# Patient Record
Sex: FEMALE | Race: WHITE | NOT HISPANIC OR LATINO
[De-identification: names, ages, dates, MRNs, and addresses within clinical notes are randomized per-mention and may not be internally consistent; named-entity substitution may affect disease eponyms.]

---

## 2019-02-21 ENCOUNTER — APPOINTMENT (OUTPATIENT)
Dept: OPHTHALMOLOGY | Facility: CLINIC | Age: 34
End: 2019-02-21
Payer: COMMERCIAL

## 2019-02-21 PROBLEM — Z00.00 ENCOUNTER FOR PREVENTIVE HEALTH EXAMINATION: Status: ACTIVE | Noted: 2019-02-21

## 2019-02-21 PROCEDURE — ZZZZZ: CPT

## 2021-03-15 ENCOUNTER — APPOINTMENT (OUTPATIENT)
Dept: HEART AND VASCULAR | Facility: CLINIC | Age: 36
End: 2021-03-15
Payer: COMMERCIAL

## 2021-03-15 DIAGNOSIS — R10.2 PELVIC AND PERINEAL PAIN: ICD-10-CM

## 2021-03-15 DIAGNOSIS — Z78.9 OTHER SPECIFIED HEALTH STATUS: ICD-10-CM

## 2021-03-15 DIAGNOSIS — Z86.19 PERSONAL HISTORY OF OTHER INFECTIOUS AND PARASITIC DISEASES: ICD-10-CM

## 2021-03-15 DIAGNOSIS — N94.89 OTHER SPECIFIED CONDITIONS ASSOCIATED WITH FEMALE GENITAL ORGANS AND MENSTRUAL CYCLE: ICD-10-CM

## 2021-03-15 DIAGNOSIS — A69.20 LYME DISEASE, UNSPECIFIED: ICD-10-CM

## 2021-03-15 PROCEDURE — 99072 ADDL SUPL MATRL&STAF TM PHE: CPT

## 2021-03-15 PROCEDURE — 99204 OFFICE O/P NEW MOD 45 MIN: CPT

## 2021-03-17 PROBLEM — R10.2 PELVIC PAIN: Status: ACTIVE | Noted: 2021-03-17

## 2021-03-17 PROBLEM — A69.20 LYME DISEASE: Status: ACTIVE | Noted: 2021-03-17

## 2021-03-17 PROBLEM — N94.89 PELVIC CONGESTION SYNDROME: Status: ACTIVE | Noted: 2021-03-15

## 2021-03-17 NOTE — ASSESSMENT
[FreeTextEntry1] : Aria Manzanares is a 35-year-old female with symptoms of pelvic congestion syndrome which began during her pregnancy a year and a half ago and became severe by the end of the pregnancy. She still has persistent symptoms including pelvic pressure and bloating which is worse when she is standing. 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 She denies any abnormal bleeding. She had an MRI performed which shows fairly extensive pelvic varicosities. Apparently she had some sort of sacroiliac damage during her delivery and took a very long time to recover terms of physical activity. Her history is also significant for multiple allergies although none to medicines. She also has a history of Lyme Disease and is on doxycycline. In addition she has some generalized systemic symptoms suggestive of connective tissue disorder although she states her rheumatologic workup so far has been negative. She takes multiple supplements and has chronic digestive symptoms. We discussed the nature of pelvic congestion and treatment, specifically ovarian vein embolization. She does wish to have further pregnancies and we discussed timing and the impact of the procedure on future pregnancies. I told her we could also do sclerotherapy of symptomatic external varicosities at the same time. She has been seen elsewhere including [facility] and had issues with insurance coverage. I told her we would try to work with her to have the procedure approved.

## 2022-02-11 ENCOUNTER — APPOINTMENT (OUTPATIENT)
Dept: OPHTHALMOLOGY | Facility: CLINIC | Age: 37
End: 2022-02-11
Payer: COMMERCIAL

## 2022-02-11 ENCOUNTER — NON-APPOINTMENT (OUTPATIENT)
Age: 37
End: 2022-02-11

## 2022-02-11 PROCEDURE — 92014 COMPRE OPH EXAM EST PT 1/>: CPT
